# Patient Record
Sex: FEMALE | Race: WHITE | URBAN - METROPOLITAN AREA
[De-identification: names, ages, dates, MRNs, and addresses within clinical notes are randomized per-mention and may not be internally consistent; named-entity substitution may affect disease eponyms.]

---

## 2021-08-02 ENCOUNTER — WEB ENCOUNTER (OUTPATIENT)
Dept: URBAN - METROPOLITAN AREA CLINIC 98 | Facility: CLINIC | Age: 36
End: 2021-08-02

## 2021-08-02 ENCOUNTER — OFFICE VISIT (OUTPATIENT)
Dept: URBAN - METROPOLITAN AREA CLINIC 98 | Facility: CLINIC | Age: 36
End: 2021-08-02
Payer: COMMERCIAL

## 2021-08-02 VITALS
DIASTOLIC BLOOD PRESSURE: 83 MMHG | HEIGHT: 60 IN | BODY MASS INDEX: 20.03 KG/M2 | HEART RATE: 87 BPM | SYSTOLIC BLOOD PRESSURE: 127 MMHG | TEMPERATURE: 97.8 F | WEIGHT: 102 LBS

## 2021-08-02 DIAGNOSIS — R19.4 CHANGE IN BOWEL HABITS: ICD-10-CM

## 2021-08-02 DIAGNOSIS — K58.0 IRRITABLE BOWEL SYNDROME WITH DIARRHEA: ICD-10-CM

## 2021-08-02 DIAGNOSIS — R11.0 NAUSEA: ICD-10-CM

## 2021-08-02 PROCEDURE — 91065 BREATH HYDROGEN/METHANE TEST: CPT | Performed by: INTERNAL MEDICINE

## 2021-08-02 PROCEDURE — 99213 OFFICE O/P EST LOW 20 MIN: CPT | Performed by: INTERNAL MEDICINE

## 2021-08-02 NOTE — HPI-TODAY'S VISIT:
Here with her two young children.  Last seen a few years ago.   had pain after last egd and colonoscopy - went to hospital, scans showed no signficant abnormality - pain was likely from retained gas.   pt says she can't lose weight.  c/o nausea  bowel movements are diarrhea, softer stools, darker  bm 1 x/ day nausea sometimes after eating tried Zegrid, protonix w/o help.   also was going through bacterial vaginosis- treatment of that did help her bowel sx

## 2021-08-13 ENCOUNTER — OFFICE VISIT (OUTPATIENT)
Dept: URBAN - METROPOLITAN AREA CLINIC 91 | Facility: CLINIC | Age: 36
End: 2021-08-13

## 2021-08-17 ENCOUNTER — OFFICE VISIT (OUTPATIENT)
Dept: URBAN - METROPOLITAN AREA CLINIC 91 | Facility: CLINIC | Age: 36
End: 2021-08-17

## 2021-08-18 ENCOUNTER — OFFICE VISIT (OUTPATIENT)
Dept: URBAN - METROPOLITAN AREA CLINIC 77 | Facility: CLINIC | Age: 36
End: 2021-08-18
Payer: COMMERCIAL

## 2021-08-18 DIAGNOSIS — K76.89 LESION OF LIVER: ICD-10-CM

## 2021-08-18 DIAGNOSIS — R11.0 NAUSEA: ICD-10-CM

## 2021-08-18 PROCEDURE — 76700 US EXAM ABDOM COMPLETE: CPT | Performed by: INTERNAL MEDICINE

## 2021-08-26 ENCOUNTER — TELEPHONE ENCOUNTER (OUTPATIENT)
Dept: URBAN - METROPOLITAN AREA CLINIC 98 | Facility: CLINIC | Age: 36
End: 2021-08-26

## 2021-08-27 ENCOUNTER — WEB ENCOUNTER (OUTPATIENT)
Dept: URBAN - METROPOLITAN AREA CLINIC 98 | Facility: CLINIC | Age: 36
End: 2021-08-27

## 2021-08-27 RX ORDER — RIFAXIMIN 550 MG/1
1 TABLET TABLET ORAL THREE TIMES A DAY
Qty: 42 TABLET | Refills: 0 | OUTPATIENT

## 2021-08-29 PROBLEM — 300331000: Status: ACTIVE | Noted: 2021-08-29

## 2021-08-31 ENCOUNTER — TELEPHONE ENCOUNTER (OUTPATIENT)
Dept: URBAN - METROPOLITAN AREA CLINIC 98 | Facility: CLINIC | Age: 36
End: 2021-08-31

## 2021-09-07 ENCOUNTER — WEB ENCOUNTER (OUTPATIENT)
Dept: URBAN - METROPOLITAN AREA CLINIC 98 | Facility: CLINIC | Age: 36
End: 2021-09-07

## 2021-09-07 ENCOUNTER — TELEPHONE ENCOUNTER (OUTPATIENT)
Dept: URBAN - METROPOLITAN AREA CLINIC 98 | Facility: CLINIC | Age: 36
End: 2021-09-07

## 2021-09-07 RX ORDER — METRONIDAZOLE 500 MG/1
1 TABLET TABLET, FILM COATED ORAL THREE TIMES A DAY
Qty: 30 | OUTPATIENT
Start: 2021-09-09 | End: 2021-09-19

## 2021-09-10 ENCOUNTER — LAB OUTSIDE AN ENCOUNTER (OUTPATIENT)
Dept: URBAN - METROPOLITAN AREA CLINIC 98 | Facility: CLINIC | Age: 36
End: 2021-09-10

## 2021-09-11 ENCOUNTER — WEB ENCOUNTER (OUTPATIENT)
Dept: URBAN - METROPOLITAN AREA CLINIC 98 | Facility: CLINIC | Age: 36
End: 2021-09-11

## 2021-09-11 ENCOUNTER — TELEPHONE ENCOUNTER (OUTPATIENT)
Dept: URBAN - METROPOLITAN AREA CLINIC 98 | Facility: CLINIC | Age: 36
End: 2021-09-11

## 2021-09-12 ENCOUNTER — WEB ENCOUNTER (OUTPATIENT)
Dept: URBAN - METROPOLITAN AREA CLINIC 98 | Facility: CLINIC | Age: 36
End: 2021-09-12

## 2021-10-07 ENCOUNTER — TELEPHONE ENCOUNTER (OUTPATIENT)
Dept: URBAN - METROPOLITAN AREA CLINIC 98 | Facility: CLINIC | Age: 36
End: 2021-10-07

## 2021-10-18 ENCOUNTER — OFFICE VISIT (OUTPATIENT)
Dept: URBAN - METROPOLITAN AREA CLINIC 98 | Facility: CLINIC | Age: 36
End: 2021-10-18
Payer: COMMERCIAL

## 2021-10-18 VITALS
DIASTOLIC BLOOD PRESSURE: 75 MMHG | SYSTOLIC BLOOD PRESSURE: 114 MMHG | HEIGHT: 60 IN | WEIGHT: 104 LBS | HEART RATE: 79 BPM | TEMPERATURE: 96.8 F | BODY MASS INDEX: 20.42 KG/M2

## 2021-10-18 DIAGNOSIS — K58.0 IRRITABLE BOWEL SYNDROME WITH DIARRHEA: ICD-10-CM

## 2021-10-18 DIAGNOSIS — R11.0 NAUSEA: ICD-10-CM

## 2021-10-18 DIAGNOSIS — R19.4 CHANGE IN BOWEL HABITS: ICD-10-CM

## 2021-10-18 PROCEDURE — 99213 OFFICE O/P EST LOW 20 MIN: CPT | Performed by: INTERNAL MEDICINE

## 2021-10-18 RX ORDER — RIFAXIMIN 550 MG/1
1 TABLET TABLET ORAL THREE TIMES A DAY
Qty: 42 TABLET | Refills: 0 | Status: ACTIVE | COMMUNITY

## 2021-10-18 NOTE — HPI-TODAY'S VISIT:
Here to follow up for abdominal pain .  accompanied by 2 young children.  MRI 9/2021 w tiny liver cysts; no other concerning findings. SIBO positive : s/p Xifaxan  - 2 weeks ago completed but she is still very concerned about her bowel sx.  Admits that spurts with the stomach issues are irregular.  1 bout of cramps and urgency once a month prev 4x/yr.   worried that she has foul smelling gas  worried that her sx will come on without warning  c/o pain on r flank that is worse with movement also c/o R inguinal pain worse with menstrual cycle

## 2021-12-06 ENCOUNTER — DASHBOARD ENCOUNTERS (OUTPATIENT)
Age: 36
End: 2021-12-06

## 2021-12-06 ENCOUNTER — OFFICE VISIT (OUTPATIENT)
Dept: URBAN - METROPOLITAN AREA CLINIC 98 | Facility: CLINIC | Age: 36
End: 2021-12-06
Payer: COMMERCIAL

## 2021-12-06 VITALS
HEART RATE: 67 BPM | HEIGHT: 60 IN | WEIGHT: 106.6 LBS | TEMPERATURE: 98.6 F | DIASTOLIC BLOOD PRESSURE: 79 MMHG | SYSTOLIC BLOOD PRESSURE: 128 MMHG | BODY MASS INDEX: 20.93 KG/M2

## 2021-12-06 DIAGNOSIS — R11.0 NAUSEA: ICD-10-CM

## 2021-12-06 DIAGNOSIS — K58.0 IRRITABLE BOWEL SYNDROME WITH DIARRHEA: ICD-10-CM

## 2021-12-06 DIAGNOSIS — K63.89 SMALL INTESTINAL BACTERIAL OVERGROWTH (SIBO): ICD-10-CM

## 2021-12-06 DIAGNOSIS — R19.4 CHANGE IN BOWEL HABITS: ICD-10-CM

## 2021-12-06 PROCEDURE — 99213 OFFICE O/P EST LOW 20 MIN: CPT | Performed by: INTERNAL MEDICINE

## 2021-12-06 RX ORDER — CIPROFLOXACIN HYDROCHLORIDE 500 MG/1
1 TABLET TABLET, FILM COATED ORAL
Qty: 20 | OUTPATIENT
Start: 2021-12-06 | End: 2021-12-16

## 2021-12-06 RX ORDER — RIFAXIMIN 550 MG/1
1 TABLET TABLET ORAL THREE TIMES A DAY
Qty: 42 TABLET | Refills: 0 | Status: ON HOLD | COMMUNITY

## 2021-12-06 RX ORDER — DEXLANSOPRAZOLE 60 MG/1
1 CAPSULE CAPSULE, DELAYED RELEASE ORAL ONCE A DAY
Qty: 90 CAPSULE | Refills: 1 | OUTPATIENT

## 2021-12-06 NOTE — PHYSICAL EXAM CONSTITUTIONAL:
normal,  alert,  in no acute distress,  well developed, well nourished,  normal communication ability , normal,  alert,  in no acute distress,  well developed, well nourished,  normal communication ability

## 2021-12-06 NOTE — PHYSICAL EXAM CHEST:
breathing is unlabored without accessory muscle use , breathing is unlabored without accessory muscle use

## 2021-12-06 NOTE — HPI-TODAY'S VISIT:
HEre with her two young children again.  Still w gas and upset stomach.  Didn't notice a difference with Xifaxan taking Zegrid daily which does help but doesn't take her symptoms away completely stomach feels full easily yaya after eating

## 2021-12-06 NOTE — PHYSICAL EXAM HENT:
normocephalic , atraumatic , Face within normal limits , normocephalic , atraumatic , Face within normal limits

## 2021-12-07 PROBLEM — 197125005: Status: ACTIVE | Noted: 2021-08-02

## 2024-09-17 ENCOUNTER — OFFICE VISIT (OUTPATIENT)
Dept: URBAN - METROPOLITAN AREA CLINIC 111 | Facility: CLINIC | Age: 39
End: 2024-09-17
Payer: COMMERCIAL

## 2024-09-17 VITALS
HEIGHT: 60 IN | SYSTOLIC BLOOD PRESSURE: 138 MMHG | DIASTOLIC BLOOD PRESSURE: 88 MMHG | HEART RATE: 89 BPM | WEIGHT: 100 LBS | BODY MASS INDEX: 19.63 KG/M2 | TEMPERATURE: 98.6 F

## 2024-09-17 DIAGNOSIS — R12 HEARTBURN: ICD-10-CM

## 2024-09-17 DIAGNOSIS — K58.1 IBS: ICD-10-CM

## 2024-09-17 PROCEDURE — 99214 OFFICE O/P EST MOD 30 MIN: CPT | Performed by: PHYSICIAN ASSISTANT

## 2024-09-17 RX ORDER — RIFAXIMIN 550 MG/1
1 TABLET TABLET ORAL THREE TIMES A DAY
Qty: 42 TABLET | Refills: 0 | Status: ON HOLD | COMMUNITY

## 2024-09-17 RX ORDER — PANTOPRAZOLE SODIUM 20 MG/1
1 TABLET TABLET, DELAYED RELEASE ORAL ONCE A DAY
Qty: 30 | Refills: 1 | OUTPATIENT
Start: 2024-09-17

## 2024-09-17 RX ORDER — DEXLANSOPRAZOLE 60 MG/1
1 CAPSULE CAPSULE, DELAYED RELEASE ORAL ONCE A DAY
Qty: 90 CAPSULE | Refills: 1 | Status: ON HOLD | COMMUNITY

## 2024-09-17 NOTE — HPI-TODAY'S VISIT:
38 y/o female here with heartburn. Seen in 12/2021 by Dr. Montgomery for nausea, change in bowel habits, IBS-D, and SIBO. It is noted her symptoms were likely functional in nature. She had unimpressive MRI. She was given more abx for SIBO. Pt was also recommended to do probiotics and look for trigger foods. S/p EGD and colon in 2018. EGD with irregular Z-line and gastritis. Path with reflux-associated changes. Colon revealing mild internal hemorrhoids o/w normal.  She started having heartburn Friday. No major h/o it but states she has taken Zegerid as needed. She has had a decreased appetite since Friday. She woke up Saturday with burning in her chest. She felt her heart racing and felt fatigued. She has been taking Gaviscon along with Zegerid. No fever, chills, or URI symptoms. She reports LUQ has been tender to touch. No sharp pain. She states it feels like an ache. No weight loss. No FH of GI cancer. Mother has had precancerous polyps. No dysphagia. She has had some nausea. No vomiting. She has a stool at least once a day.  She goes through flares of IBS at times.  No diarrhea. No recent travel. No regular use of NSAIDs.

## 2024-10-03 ENCOUNTER — OFFICE VISIT (OUTPATIENT)
Dept: URBAN - METROPOLITAN AREA CLINIC 111 | Facility: CLINIC | Age: 39
End: 2024-10-03
Payer: COMMERCIAL

## 2024-10-03 VITALS
SYSTOLIC BLOOD PRESSURE: 119 MMHG | TEMPERATURE: 98.1 F | HEART RATE: 71 BPM | BODY MASS INDEX: 20.07 KG/M2 | HEIGHT: 60 IN | DIASTOLIC BLOOD PRESSURE: 75 MMHG | WEIGHT: 102.2 LBS

## 2024-10-03 DIAGNOSIS — R12 HEARTBURN: ICD-10-CM

## 2024-10-03 DIAGNOSIS — R10.13 EPIGASTRIC PAIN: ICD-10-CM

## 2024-10-03 PROCEDURE — 99214 OFFICE O/P EST MOD 30 MIN: CPT | Performed by: STUDENT IN AN ORGANIZED HEALTH CARE EDUCATION/TRAINING PROGRAM

## 2024-10-03 RX ORDER — FAMOTIDINE 40 MG/1
1 TABLET TABLET, FILM COATED ORAL ONCE A DAY
Qty: 30 | Refills: 3 | OUTPATIENT
Start: 2024-10-03

## 2024-10-03 NOTE — HPI-TODAY'S VISIT:
40 yo F here for follow up. She saw Maggi SHORT in Sept 2024 She was unable to tolerate coming off of Zegerid. She takes Zegerid and Gaviscon for her heartburn Sx HP test pending

## 2024-10-03 NOTE — HPI-OTHER HISTORIES
Last visit:  40 y/o female here with heartburn. Seen in 12/2021 by Dr. Montgomery for nausea, change in bowel habits, IBS-D, and SIBO. It is noted her symptoms were likely functional in nature. She had unimpressive MRI. She was given more abx for SIBO. Pt was also recommended to do probiotics and look for trigger foods. S/p EGD and colon in 2018. EGD with irregular Z-line and gastritis. Path with reflux-associated changes. Colon revealing mild internal hemorrhoids o/w normal.  She started having heartburn Friday. No major h/o it but states she has taken Zegerid as needed. She has had a decreased appetite since Friday. She woke up Saturday with burning in her chest. She felt her heart racing and felt fatigued. She has been taking Gaviscon along with Zegerid. No fever, chills, or URI symptoms. She reports LUQ has been tender to touch. No sharp pain. She states it feels like an ache. No weight loss. No FH of GI cancer. Mother has had precancerous polyps. No dysphagia. She has had some nausea. No vomiting. She has a stool at least once a day.  She goes through flares of IBS at times.  No diarrhea. No recent travel. No regular use of NSAIDs.  -will do h. pylori test -stop Zegerid for several days before test  -start Pantoprazole 20 mg qd after test & take for 2 months -if positive will send in tx -pt also reports fatigue; recommend seeing PCP -she states labs 2 months ago were all fine -f/up in 3 months or sooner if sx worsen  -EGD in 2018; if sx persist or return will do EGD -all questions answered

## 2024-10-17 ENCOUNTER — OFFICE VISIT (OUTPATIENT)
Dept: URBAN - METROPOLITAN AREA CLINIC 98 | Facility: CLINIC | Age: 39
End: 2024-10-17

## 2024-10-17 ENCOUNTER — LAB OUTSIDE AN ENCOUNTER (OUTPATIENT)
Dept: URBAN - METROPOLITAN AREA CLINIC 98 | Facility: CLINIC | Age: 39
End: 2024-10-17

## 2024-10-17 ENCOUNTER — TELEPHONE ENCOUNTER (OUTPATIENT)
Dept: URBAN - METROPOLITAN AREA CLINIC 98 | Facility: CLINIC | Age: 39
End: 2024-10-17

## 2024-10-17 VITALS
WEIGHT: 102 LBS | DIASTOLIC BLOOD PRESSURE: 79 MMHG | HEART RATE: 85 BPM | HEIGHT: 60 IN | SYSTOLIC BLOOD PRESSURE: 125 MMHG | TEMPERATURE: 97 F | BODY MASS INDEX: 20.03 KG/M2

## 2024-10-17 RX ORDER — FAMOTIDINE 40 MG/1
1 TABLET TABLET, FILM COATED ORAL ONCE A DAY
Qty: 30 | Refills: 3 | Status: ACTIVE | COMMUNITY
Start: 2024-10-03

## 2024-10-17 NOTE — HPI-OTHER HISTORIES
Last visit: HAN Navarro  38 y/o female here with heartburn. Seen in 12/2021 by Dr. Montgomery for nausea, change in bowel habits, IBS-D, and SIBO. It is noted her symptoms were likely functional in nature. She had unimpressive MRI. She was given more abx for SIBO. Pt was also recommended to do probiotics and look for trigger foods. S/p EGD and colon in 2018. EGD with irregular Z-line and gastritis. Path with reflux-associated changes. Colon revealing mild internal hemorrhoids o/w normal.  She started having heartburn Friday. No major h/o it but states she has taken Zegerid as needed. She has had a decreased appetite since Friday. She woke up Saturday with burning in her chest. She felt her heart racing and felt fatigued. She has been taking Gaviscon along with Zegerid. No fever, chills, or URI symptoms. She reports LUQ has been tender to touch. No sharp pain. She states it feels like an ache. No weight loss. No FH of GI cancer. Mother has had precancerous polyps. No dysphagia. She has had some nausea. No vomiting. She has a stool at least once a day.  She goes through flares of IBS at times.  No diarrhea. No recent travel. No regular use of NSAIDs.  -will do h. pylori test -stop Zegerid for several days before test  -start Pantoprazole 20 mg qd after test & take for 2 months -if positive will send in tx -pt also reports fatigue; recommend seeing PCP -she states labs 2 months ago were all fine -f/up in 3 months or sooner if sx worsen  -EGD in 2018; if sx persist or return will do EGD -all questions answered  10/3/24- Dr. Sruthi Smith 40 yo F here for follow up. She saw Maggi SHORT in Sept 2024 She was unable to tolerate coming off of Zegerid. She takes Zegerid and Gaviscon for her heartburn Sx HP test pending

## 2024-10-17 NOTE — HPI-TODAY'S VISIT:
10/17/24- Grazyna Emery,NP - 40 yo female here with complaints of - PCP Dr. Luis You - Previously saw Dr. Smith on 10/3/24, and Cleo Zhao - Symptoms started 2 months ago: dizziness, faintness, severe heartburn, fullness, muscle soreness, HA, indigestion, palpitaions - Has not completed h.pylori test was on PPI - Taking pepcid and gaviscon around the clock - Very uncomfortable and does not feel well - Stools are different but does not have diarrhea - Denies fever or chills - Occassional abdominal discomfort   Enter other infor

## 2024-10-20 LAB
H PYLORI BREATH TEST: NEGATIVE
H. PYLORI BREATH COLLECTION: (no result)

## 2024-11-11 ENCOUNTER — OFFICE VISIT (OUTPATIENT)
Dept: URBAN - METROPOLITAN AREA SURGERY CENTER 18 | Facility: SURGERY CENTER | Age: 39
End: 2024-11-11

## 2024-11-11 ENCOUNTER — TELEPHONE ENCOUNTER (OUTPATIENT)
Dept: URBAN - METROPOLITAN AREA CLINIC 98 | Facility: CLINIC | Age: 39
End: 2024-11-11

## 2024-11-12 ENCOUNTER — TELEPHONE ENCOUNTER (OUTPATIENT)
Dept: URBAN - METROPOLITAN AREA CLINIC 98 | Facility: CLINIC | Age: 39
End: 2024-11-12

## 2024-11-12 RX ORDER — RIFAXIMIN 550 MG/1
1 TABLET TABLET ORAL THREE TIMES A DAY
Qty: 42 TABLET | Refills: 0 | OUTPATIENT
Start: 2024-11-13 | End: 2024-11-27

## 2024-11-15 ENCOUNTER — TELEPHONE ENCOUNTER (OUTPATIENT)
Dept: URBAN - METROPOLITAN AREA CLINIC 98 | Facility: CLINIC | Age: 39
End: 2024-11-15

## 2024-11-19 ENCOUNTER — TELEPHONE ENCOUNTER (OUTPATIENT)
Dept: URBAN - METROPOLITAN AREA CLINIC 98 | Facility: CLINIC | Age: 39
End: 2024-11-19

## 2024-11-22 ENCOUNTER — TELEPHONE ENCOUNTER (OUTPATIENT)
Dept: URBAN - METROPOLITAN AREA CLINIC 98 | Facility: CLINIC | Age: 39
End: 2024-11-22

## 2024-11-26 ENCOUNTER — TELEPHONE ENCOUNTER (OUTPATIENT)
Dept: URBAN - METROPOLITAN AREA CLINIC 96 | Facility: CLINIC | Age: 39
End: 2024-11-26

## 2024-12-04 ENCOUNTER — CLAIMS CREATED FROM THE CLAIM WINDOW (OUTPATIENT)
Dept: URBAN - METROPOLITAN AREA SURGERY CENTER 18 | Facility: SURGERY CENTER | Age: 39
End: 2024-12-04
Payer: COMMERCIAL

## 2024-12-04 DIAGNOSIS — K29.70 GASTRITIS: ICD-10-CM

## 2024-12-04 DIAGNOSIS — K29.80 ACUTE DUODENITIS: ICD-10-CM

## 2024-12-04 DIAGNOSIS — K29.60 ADENOPAPILLOMATOSIS GASTRICA: ICD-10-CM

## 2024-12-04 DIAGNOSIS — K31.89 OTHER DISEASES OF STOMACH AND DUODENUM: ICD-10-CM

## 2024-12-04 DIAGNOSIS — K21.00 ALKALINE REFLUX ESOPHAGITIS: ICD-10-CM

## 2024-12-04 PROCEDURE — 43239 EGD BIOPSY SINGLE/MULTIPLE: CPT | Performed by: INTERNAL MEDICINE

## 2024-12-04 PROCEDURE — 00731 ANES UPR GI NDSC PX NOS: CPT | Performed by: NURSE ANESTHETIST, CERTIFIED REGISTERED

## 2024-12-04 RX ORDER — FAMOTIDINE 40 MG/1
1 TABLET TABLET, FILM COATED ORAL ONCE A DAY
Qty: 30 | Refills: 3 | Status: ACTIVE | COMMUNITY
Start: 2024-10-03

## 2024-12-12 ENCOUNTER — TELEPHONE ENCOUNTER (OUTPATIENT)
Dept: URBAN - METROPOLITAN AREA CLINIC 96 | Facility: CLINIC | Age: 39
End: 2024-12-12

## 2024-12-16 ENCOUNTER — OFFICE VISIT (OUTPATIENT)
Dept: URBAN - METROPOLITAN AREA TELEHEALTH 2 | Facility: TELEHEALTH | Age: 39
End: 2024-12-16
Payer: COMMERCIAL

## 2024-12-16 VITALS — BODY MASS INDEX: 19.83 KG/M2 | HEIGHT: 60 IN | WEIGHT: 101 LBS

## 2024-12-16 DIAGNOSIS — K21.9 CHRONIC GERD: ICD-10-CM

## 2024-12-16 DIAGNOSIS — K63.8219 SMALL INTESTINAL BACTERIAL OVERGROWTH (SIBO): ICD-10-CM

## 2024-12-16 PROBLEM — 235595009: Status: ACTIVE | Noted: 2024-12-16

## 2024-12-16 PROCEDURE — 99212 OFFICE O/P EST SF 10 MIN: CPT | Performed by: INTERNAL MEDICINE

## 2024-12-16 RX ORDER — NEOMYCIN SULFATE 500 MG/1
1 TAB TABLET ORAL TWICE A DAY
Qty: 28 TABLET | Refills: 0 | OUTPATIENT
Start: 2024-12-16

## 2024-12-16 RX ORDER — RIFAXIMIN 550 MG/1
1 TABLET TABLET ORAL THREE TIMES A DAY
Qty: 42 TABLET | Refills: 0 | OUTPATIENT
Start: 2024-12-16 | End: 2024-12-30

## 2024-12-16 RX ORDER — FAMOTIDINE 40 MG/1
1 TABLET TABLET, FILM COATED ORAL TWICE A DAY
Qty: 60 TABLET | Refills: 1 | OUTPATIENT
Start: 2024-12-16

## 2024-12-16 RX ORDER — FAMOTIDINE 40 MG/1
1 TABLET TABLET, FILM COATED ORAL ONCE A DAY
Qty: 30 | Refills: 3 | Status: ACTIVE | COMMUNITY
Start: 2024-10-03

## 2024-12-16 NOTE — PHYSICAL EXAM CONSTITUTIONAL:
Pt called in while I was in the phone room wanting to clarify her appt was with Bettyenathen Horner and not helga I confirmed the appt. She stated she was told she could jhoan her physical with Bettye Evens . She stated she did not want to see helga any longer that's why she was calling in to clarify who her appt was with. well developed, well nourished , in mild distress

## 2024-12-16 NOTE — HPI-TODAY'S VISIT:
Here with  on tele health to follow up after endoscopy  still having issues with stomach and reflux and abdomen is distended  felt dizzy - feels like she almost passed out at breakfast similar symptoms since August  Since then, she did get samples of xifaxan for 2 weeks - felt unchanged  stopped pepcid by herself  and feels weak and fatigued  taking famotidine 40mg prior to eating in am , and 20mg prior to dinner, and gaviscon which is helping a little  She wants an extended Xifaxan course  her  wants her to have Xifaxan and neomycin  She does not want other antibiotics  She says she cannot take it

## 2024-12-31 ENCOUNTER — TELEPHONE ENCOUNTER (OUTPATIENT)
Dept: URBAN - METROPOLITAN AREA CLINIC 96 | Facility: CLINIC | Age: 39
End: 2024-12-31

## 2025-01-02 ENCOUNTER — LAB OUTSIDE AN ENCOUNTER (OUTPATIENT)
Dept: URBAN - METROPOLITAN AREA CLINIC 96 | Facility: CLINIC | Age: 40
End: 2025-01-02

## 2025-01-13 ENCOUNTER — OFFICE VISIT (OUTPATIENT)
Dept: URBAN - METROPOLITAN AREA CLINIC 19 | Facility: CLINIC | Age: 40
End: 2025-01-13

## 2025-01-13 ENCOUNTER — TELEPHONE ENCOUNTER (OUTPATIENT)
Dept: URBAN - METROPOLITAN AREA CLINIC 19 | Facility: CLINIC | Age: 40
End: 2025-01-13

## 2025-01-21 ENCOUNTER — OFFICE VISIT (OUTPATIENT)
Dept: URBAN - METROPOLITAN AREA CLINIC 19 | Facility: CLINIC | Age: 40
End: 2025-01-21
Payer: COMMERCIAL

## 2025-01-21 VITALS
SYSTOLIC BLOOD PRESSURE: 117 MMHG | OXYGEN SATURATION: 100 % | WEIGHT: 105.2 LBS | BODY MASS INDEX: 20.65 KG/M2 | HEART RATE: 73 BPM | HEIGHT: 60 IN | DIASTOLIC BLOOD PRESSURE: 71 MMHG | TEMPERATURE: 97.9 F

## 2025-01-21 DIAGNOSIS — K90.89 OTHER SPECIFIED INTESTINAL MALABSORPTION: ICD-10-CM

## 2025-01-21 DIAGNOSIS — K63.8219 SMALL INTESTINAL BACTERIAL OVERGROWTH (SIBO): ICD-10-CM

## 2025-01-21 DIAGNOSIS — R14.0 ABDOMINAL BLOATING: ICD-10-CM

## 2025-01-21 DIAGNOSIS — R42 DIZZINESS: ICD-10-CM

## 2025-01-21 PROBLEM — 32230006: Status: ACTIVE | Noted: 2025-01-21

## 2025-01-21 PROCEDURE — 99214 OFFICE O/P EST MOD 30 MIN: CPT | Performed by: INTERNAL MEDICINE

## 2025-01-21 RX ORDER — FAMOTIDINE 40 MG/1
1 TABLET TABLET, FILM COATED ORAL TWICE A DAY
Qty: 60 TABLET | Refills: 1 | Status: DISCONTINUED | COMMUNITY
Start: 2024-12-16

## 2025-01-21 RX ORDER — NEOMYCIN SULFATE 500 MG/1
1 TAB TABLET ORAL TWICE A DAY
Qty: 28 TABLET | Refills: 0 | Status: DISCONTINUED | COMMUNITY
Start: 2024-12-16

## 2025-01-21 RX ORDER — FAMOTIDINE 40 MG/1
1 TABLET TABLET, FILM COATED ORAL ONCE A DAY
Qty: 30 | Refills: 3 | Status: ACTIVE | COMMUNITY
Start: 2024-10-03

## 2025-01-21 NOTE — HPI-TODAY'S VISIT:
39 yr old woman here to get second opinion on her positive Sibo test. Here with her  Dr. Juan Mooney a dentist. She had a positive SIBO test and had a course of Xifaxan -  partial improvement. Had horrible nausea and reflux which did get better. But after every meal , feels bloated, dizzy and weak with tingling and numbness in her extremities. No diarrhea or constipation.

## 2025-01-31 LAB
FECAL FAT, QUALITATIVE: NORMAL
PANCREATIC ELASTASE, FECAL: >800